# Patient Record
Sex: MALE | Race: WHITE | NOT HISPANIC OR LATINO | Employment: OTHER | URBAN - METROPOLITAN AREA
[De-identification: names, ages, dates, MRNs, and addresses within clinical notes are randomized per-mention and may not be internally consistent; named-entity substitution may affect disease eponyms.]

---

## 2017-01-01 ENCOUNTER — HOSPITAL ENCOUNTER (OUTPATIENT)
Dept: NON INVASIVE DIAGNOSTICS | Facility: CLINIC | Age: 82
Discharge: HOME | End: 2017-04-12
Payer: MEDICARE

## 2017-01-01 ENCOUNTER — GENERIC CONVERSION - ENCOUNTER (OUTPATIENT)
Dept: OTHER | Facility: OTHER | Age: 82
End: 2017-01-01

## 2017-01-01 ENCOUNTER — ALLSCRIPTS OFFICE VISIT (OUTPATIENT)
Dept: OTHER | Facility: OTHER | Age: 82
End: 2017-01-01

## 2017-01-01 ENCOUNTER — APPOINTMENT (EMERGENCY)
Dept: CT IMAGING | Facility: HOSPITAL | Age: 82
End: 2017-01-01
Payer: MEDICARE

## 2017-01-01 ENCOUNTER — HOSPITAL ENCOUNTER (INPATIENT)
Facility: HOSPITAL | Age: 82
LOS: 2 days | DRG: 064 | End: 2017-09-12
Attending: ANESTHESIOLOGY | Admitting: ANESTHESIOLOGY
Payer: MEDICARE

## 2017-01-01 ENCOUNTER — APPOINTMENT (EMERGENCY)
Dept: RADIOLOGY | Facility: HOSPITAL | Age: 82
End: 2017-01-01
Payer: MEDICARE

## 2017-01-01 ENCOUNTER — APPOINTMENT (OUTPATIENT)
Dept: LAB | Facility: CLINIC | Age: 82
End: 2017-01-01
Payer: MEDICARE

## 2017-01-01 ENCOUNTER — TRANSCRIBE ORDERS (OUTPATIENT)
Dept: NON INVASIVE DIAGNOSTICS | Facility: CLINIC | Age: 82
End: 2017-01-01

## 2017-01-01 ENCOUNTER — HOSPITAL ENCOUNTER (EMERGENCY)
Facility: HOSPITAL | Age: 82
End: 2017-09-10
Attending: EMERGENCY MEDICINE | Admitting: EMERGENCY MEDICINE
Payer: MEDICARE

## 2017-01-01 VITALS
WEIGHT: 175.49 LBS | RESPIRATION RATE: 15 BRPM | BODY MASS INDEX: 24.57 KG/M2 | HEIGHT: 71 IN | HEART RATE: 60 BPM | OXYGEN SATURATION: 99 % | SYSTOLIC BLOOD PRESSURE: 151 MMHG | TEMPERATURE: 100 F | DIASTOLIC BLOOD PRESSURE: 56 MMHG

## 2017-01-01 VITALS
DIASTOLIC BLOOD PRESSURE: 64 MMHG | RESPIRATION RATE: 20 BRPM | TEMPERATURE: 97.3 F | WEIGHT: 188.27 LBS | SYSTOLIC BLOOD PRESSURE: 141 MMHG | OXYGEN SATURATION: 100 % | HEART RATE: 95 BPM

## 2017-01-01 DIAGNOSIS — E78.5 HYPERLIPIDEMIA: ICD-10-CM

## 2017-01-01 DIAGNOSIS — N18.9 CHRONIC KIDNEY DISEASE: ICD-10-CM

## 2017-01-01 DIAGNOSIS — I25.10 ATHEROSCLEROTIC HEART DISEASE OF NATIVE CORONARY ARTERY WITHOUT ANGINA PECTORIS: ICD-10-CM

## 2017-01-01 DIAGNOSIS — I24.8 DEMAND ISCHEMIA (HCC): ICD-10-CM

## 2017-01-01 DIAGNOSIS — I10 ESSENTIAL (PRIMARY) HYPERTENSION: ICD-10-CM

## 2017-01-01 DIAGNOSIS — R73.9 HYPERGLYCEMIA: ICD-10-CM

## 2017-01-01 DIAGNOSIS — I61.5 INTRAVENTRICULAR HEMORRHAGE (HCC): ICD-10-CM

## 2017-01-01 DIAGNOSIS — I65.29 EXTERNAL CAROTID ARTERY STENOSIS: ICD-10-CM

## 2017-01-01 DIAGNOSIS — M62.82 RHABDOMYOLYSIS: ICD-10-CM

## 2017-01-01 DIAGNOSIS — K92.0 COFFEE GROUND EMESIS: ICD-10-CM

## 2017-01-01 DIAGNOSIS — I65.29 STENOSIS OF CAROTID ARTERY, UNSPECIFIED LATERALITY: Primary | ICD-10-CM

## 2017-01-01 DIAGNOSIS — I62.9 INTRACRANIAL HEMORRHAGE (HCC): Primary | ICD-10-CM

## 2017-01-01 DIAGNOSIS — K92.2 GI BLEED: ICD-10-CM

## 2017-01-01 DIAGNOSIS — N17.9 ACUTE RENAL FAILURE (HCC): ICD-10-CM

## 2017-01-01 DIAGNOSIS — E87.2 LACTIC ACID INCREASED: ICD-10-CM

## 2017-01-01 DIAGNOSIS — I60.9 SAH (SUBARACHNOID HEMORRHAGE) (HCC): Primary | ICD-10-CM

## 2017-01-01 LAB
ABO GROUP BLD: NORMAL
ALBUMIN SERPL BCP-MCNC: 2.9 G/DL (ref 3.5–5)
ALBUMIN SERPL BCP-MCNC: 3.8 G/DL (ref 3.5–5)
ALP SERPL-CCNC: 46 U/L (ref 46–116)
ALP SERPL-CCNC: 59 U/L (ref 46–116)
ALT SERPL W P-5'-P-CCNC: 29 U/L (ref 12–78)
ALT SERPL W P-5'-P-CCNC: 37 U/L (ref 12–78)
AMMONIA PLAS-SCNC: <10 UMOL/L (ref 11–35)
ANION GAP BLD CALC-SCNC: 21 MMOL/L (ref 4–13)
ANION GAP SERPL CALCULATED.3IONS-SCNC: 17 MMOL/L (ref 4–13)
ANION GAP SERPL CALCULATED.3IONS-SCNC: 6 MMOL/L (ref 4–13)
ANION GAP SERPL CALCULATED.3IONS-SCNC: 7 MMOL/L (ref 4–13)
APAP SERPL-MCNC: <2 UG/ML (ref 10–30)
APTT PPP: 30 SECONDS (ref 23–35)
APTT PPP: 30 SECONDS (ref 23–35)
ARTERIAL PATENCY WRIST A: YES
AST SERPL W P-5'-P-CCNC: 69 U/L (ref 5–45)
AST SERPL W P-5'-P-CCNC: 78 U/L (ref 5–45)
ATRIAL RATE: 103 BPM
BACTERIA UR QL AUTO: ABNORMAL /HPF
BASE EXCESS BLDA CALC-SCNC: -4.7 MMOL/L
BASOPHILS # BLD AUTO: 0.03 THOUSANDS/ΜL (ref 0–0.1)
BASOPHILS # BLD AUTO: 0.06 THOUSANDS/ΜL (ref 0–0.1)
BASOPHILS NFR BLD AUTO: 0 % (ref 0–1)
BASOPHILS NFR BLD AUTO: 1 % (ref 0–1)
BILIRUB SERPL-MCNC: 0.9 MG/DL (ref 0.2–1)
BILIRUB SERPL-MCNC: 1.47 MG/DL (ref 0.2–1)
BILIRUB UR QL STRIP: NEGATIVE
BLD GP AB SCN SERPL QL: NEGATIVE
BUN BLD-MCNC: 39 MG/DL (ref 5–25)
BUN SERPL-MCNC: 34 MG/DL (ref 5–25)
BUN SERPL-MCNC: 40 MG/DL (ref 5–25)
BUN SERPL-MCNC: 41 MG/DL (ref 5–25)
CA-I BLD-SCNC: 1.16 MMOL/L (ref 1.12–1.32)
CALCIUM SERPL-MCNC: 10 MG/DL (ref 8.3–10.1)
CALCIUM SERPL-MCNC: 8.3 MG/DL (ref 8.3–10.1)
CALCIUM SERPL-MCNC: 9.4 MG/DL (ref 8.3–10.1)
CHLORIDE BLD-SCNC: 105 MMOL/L (ref 100–108)
CHLORIDE SERPL-SCNC: 101 MMOL/L (ref 100–108)
CHLORIDE SERPL-SCNC: 107 MMOL/L (ref 100–108)
CHLORIDE SERPL-SCNC: 108 MMOL/L (ref 100–108)
CHOLEST SERPL-MCNC: 150 MG/DL (ref 50–200)
CK MB SERPL-MCNC: 1.3 % (ref 0–2.5)
CK MB SERPL-MCNC: 31.4 NG/ML (ref 0–5)
CK SERPL-CCNC: 2371 U/L (ref 39–308)
CLARITY UR: ABNORMAL
CO2 SERPL-SCNC: 22 MMOL/L (ref 21–32)
CO2 SERPL-SCNC: 25 MMOL/L (ref 21–32)
CO2 SERPL-SCNC: 27 MMOL/L (ref 21–32)
COLOR UR: YELLOW
CREAT BLD-MCNC: 1.8 MG/DL (ref 0.6–1.3)
CREAT SERPL-MCNC: 1.6 MG/DL (ref 0.6–1.3)
CREAT SERPL-MCNC: 1.67 MG/DL (ref 0.6–1.3)
CREAT SERPL-MCNC: 2.18 MG/DL (ref 0.6–1.3)
EOSINOPHIL # BLD AUTO: 0 THOUSAND/ΜL (ref 0–0.61)
EOSINOPHIL # BLD AUTO: 1.04 THOUSAND/ΜL (ref 0–0.61)
EOSINOPHIL NFR BLD AUTO: 0 % (ref 0–6)
EOSINOPHIL NFR BLD AUTO: 16 % (ref 0–6)
ERYTHROCYTE [DISTWIDTH] IN BLOOD BY AUTOMATED COUNT: 12 % (ref 11.6–15.1)
ERYTHROCYTE [DISTWIDTH] IN BLOOD BY AUTOMATED COUNT: 12.8 % (ref 11.6–15.1)
ERYTHROCYTE [DISTWIDTH] IN BLOOD BY AUTOMATED COUNT: 12.9 % (ref 11.6–15.1)
EST. AVERAGE GLUCOSE BLD GHB EST-MCNC: 137 MG/DL
ETHANOL SERPL-MCNC: <3 MG/DL (ref 0–3)
GFR SERPL CREATININE-BSD FRML MDRD: 26 ML/MIN/1.73SQ M
GFR SERPL CREATININE-BSD FRML MDRD: 33 ML/MIN/1.73SQ M
GFR SERPL CREATININE-BSD FRML MDRD: 36 ML/MIN/1.73SQ M
GFR SERPL CREATININE-BSD FRML MDRD: 41.2 ML/MIN/1.73SQ M
GLUCOSE P FAST SERPL-MCNC: 89 MG/DL (ref 65–99)
GLUCOSE SERPL-MCNC: 118 MG/DL (ref 65–140)
GLUCOSE SERPL-MCNC: 151 MG/DL (ref 65–140)
GLUCOSE SERPL-MCNC: 153 MG/DL (ref 65–140)
GLUCOSE UR STRIP-MCNC: NEGATIVE MG/DL
HBA1C MFR BLD: 6.4 % (ref 4.2–6.3)
HCO3 BLDA-SCNC: 20.2 MMOL/L (ref 22–28)
HCT VFR BLD AUTO: 37.1 % (ref 36.5–49.3)
HCT VFR BLD AUTO: 41.5 % (ref 36.5–49.3)
HCT VFR BLD AUTO: 43 % (ref 36.5–49.3)
HCT VFR BLD AUTO: 44.7 % (ref 36.5–49.3)
HCT VFR BLD CALC: 46 % (ref 36.5–49.3)
HDLC SERPL-MCNC: 36 MG/DL (ref 40–60)
HGB BLD-MCNC: 12.2 G/DL (ref 12–17)
HGB BLD-MCNC: 13.6 G/DL (ref 12–17)
HGB BLD-MCNC: 13.7 G/DL (ref 12–17)
HGB BLD-MCNC: 14.4 G/DL (ref 12–17)
HGB BLDA-MCNC: 15.6 G/DL (ref 12–17)
HGB UR QL STRIP.AUTO: ABNORMAL
HOROWITZ INDEX BLDA+IHG-RTO: 75 MM[HG]
HYALINE CASTS #/AREA URNS LPF: ABNORMAL /LPF
INR PPP: 1.14 (ref 0.86–1.16)
INR PPP: 1.19 (ref 0.86–1.16)
KETONES UR STRIP-MCNC: ABNORMAL MG/DL
LACTATE SERPL-SCNC: 9.3 MMOL/L (ref 0.5–2)
LDLC SERPL CALC-MCNC: 85 MG/DL (ref 0–100)
LEUKOCYTE ESTERASE UR QL STRIP: NEGATIVE
LYMPHOCYTES # BLD AUTO: 0.77 THOUSANDS/ΜL (ref 0.6–4.47)
LYMPHOCYTES # BLD AUTO: 1.34 THOUSANDS/ΜL (ref 0.6–4.47)
LYMPHOCYTES NFR BLD AUTO: 21 % (ref 14–44)
LYMPHOCYTES NFR BLD AUTO: 4 % (ref 14–44)
MAGNESIUM SERPL-MCNC: 1.8 MG/DL (ref 1.6–2.6)
MCH RBC QN AUTO: 31 PG (ref 26.8–34.3)
MCH RBC QN AUTO: 31.1 PG (ref 26.8–34.3)
MCH RBC QN AUTO: 31.6 PG (ref 26.8–34.3)
MCHC RBC AUTO-ENTMCNC: 32.2 G/DL (ref 31.4–37.4)
MCHC RBC AUTO-ENTMCNC: 32.8 G/DL (ref 31.4–37.4)
MCHC RBC AUTO-ENTMCNC: 32.9 G/DL (ref 31.4–37.4)
MCV RBC AUTO: 95 FL (ref 82–98)
MCV RBC AUTO: 96 FL (ref 82–98)
MCV RBC AUTO: 97 FL (ref 82–98)
MONOCYTES # BLD AUTO: 1.13 THOUSAND/ΜL (ref 0.17–1.22)
MONOCYTES # BLD AUTO: 2.19 THOUSAND/ΜL (ref 0.17–1.22)
MONOCYTES NFR BLD AUTO: 11 % (ref 4–12)
MONOCYTES NFR BLD AUTO: 17 % (ref 4–12)
NEUTROPHILS # BLD AUTO: 17.56 THOUSANDS/ΜL (ref 1.85–7.62)
NEUTROPHILS # BLD AUTO: 2.96 THOUSANDS/ΜL (ref 1.85–7.62)
NEUTS SEG NFR BLD AUTO: 45 % (ref 43–75)
NEUTS SEG NFR BLD AUTO: 85 % (ref 43–75)
NITRITE UR QL STRIP: NEGATIVE
NON-SQ EPI CELLS URNS QL MICRO: ABNORMAL /HPF
NRBC BLD AUTO-RTO: 0 /100 WBCS
NRBC BLD AUTO-RTO: 0 /100 WBCS
O2 CT BLDA-SCNC: 19.8 ML/DL (ref 16–23)
OXYHGB MFR BLDA: 98.4 % (ref 94–97)
P AXIS: 59 DEGREES
PCO2 BLD: 22 MMOL/L (ref 21–32)
PCO2 BLDA: 37.1 MM HG (ref 36–44)
PEEP RESPIRATORY: 5 CM[H2O]
PH BLDA: 7.35 [PH] (ref 7.35–7.45)
PH UR STRIP.AUTO: 6 [PH] (ref 4.5–8)
PLATELET # BLD AUTO: 170 THOUSANDS/UL (ref 149–390)
PLATELET # BLD AUTO: 213 THOUSANDS/UL (ref 149–390)
PLATELET # BLD AUTO: 215 THOUSANDS/UL (ref 149–390)
PMV BLD AUTO: 10.7 FL (ref 8.9–12.7)
PMV BLD AUTO: 11.6 FL (ref 8.9–12.7)
PMV BLD AUTO: 11.6 FL (ref 8.9–12.7)
PO2 BLDA: 219.3 MM HG (ref 75–129)
POTASSIUM BLD-SCNC: 4.9 MMOL/L (ref 3.5–5.3)
POTASSIUM SERPL-SCNC: 4.9 MMOL/L (ref 3.5–5.3)
POTASSIUM SERPL-SCNC: 4.9 MMOL/L (ref 3.5–5.3)
POTASSIUM SERPL-SCNC: 6.1 MMOL/L (ref 3.5–5.3)
PR INTERVAL: 242 MS
PROT SERPL-MCNC: 6.6 G/DL (ref 6.4–8.2)
PROT SERPL-MCNC: 8.4 G/DL (ref 6.4–8.2)
PROT UR STRIP-MCNC: ABNORMAL MG/DL
PROTHROMBIN TIME: 14.9 SECONDS (ref 12.1–14.4)
PROTHROMBIN TIME: 15.2 SECONDS (ref 12.1–14.4)
QRS AXIS: 12 DEGREES
QRSD INTERVAL: 76 MS
QT INTERVAL: 324 MS
QTC INTERVAL: 424 MS
RBC # BLD AUTO: 3.92 MILLION/UL (ref 3.88–5.62)
RBC # BLD AUTO: 4.3 MILLION/UL (ref 3.88–5.62)
RBC # BLD AUTO: 4.64 MILLION/UL (ref 3.88–5.62)
RBC #/AREA URNS AUTO: ABNORMAL /HPF
RH BLD: NEGATIVE
SALICYLATES SERPL-MCNC: <3 MG/DL (ref 3–20)
SODIUM BLD-SCNC: 142 MMOL/L (ref 136–145)
SODIUM SERPL-SCNC: 140 MMOL/L (ref 136–145)
SP GR UR STRIP.AUTO: 1.02 (ref 1–1.03)
SPECIMEN EXPIRATION DATE: NORMAL
SPECIMEN SOURCE: ABNORMAL
T WAVE AXIS: 29 DEGREES
TRIGL SERPL-MCNC: 145 MG/DL
TROPONIN I BLD-MCNC: 0.42 NG/ML (ref 0–0.08)
TROPONIN I SERPL-MCNC: 0.99 NG/ML
UROBILINOGEN UR QL STRIP.AUTO: 0.2 E.U./DL
VENT AC: 12
VENT- AC: AC
VENTRICULAR RATE: 103 BPM
VT SETTING VENT: 450 ML
WBC # BLD AUTO: 20.69 THOUSAND/UL (ref 4.31–10.16)
WBC # BLD AUTO: 20.7 THOUSAND/UL (ref 4.31–10.16)
WBC # BLD AUTO: 6.55 THOUSAND/UL (ref 4.31–10.16)
WBC #/AREA URNS AUTO: ABNORMAL /HPF

## 2017-01-01 PROCEDURE — 80053 COMPREHEN METABOLIC PANEL: CPT | Performed by: EMERGENCY MEDICINE

## 2017-01-01 PROCEDURE — C9113 INJ PANTOPRAZOLE SODIUM, VIA: HCPCS | Performed by: EMERGENCY MEDICINE

## 2017-01-01 PROCEDURE — 94002 VENT MGMT INPAT INIT DAY: CPT

## 2017-01-01 PROCEDURE — 84484 ASSAY OF TROPONIN QUANT: CPT

## 2017-01-01 PROCEDURE — 94760 N-INVAS EAR/PLS OXIMETRY 1: CPT

## 2017-01-01 PROCEDURE — 83735 ASSAY OF MAGNESIUM: CPT | Performed by: PHYSICIAN ASSISTANT

## 2017-01-01 PROCEDURE — 86901 BLOOD TYPING SEROLOGIC RH(D): CPT | Performed by: EMERGENCY MEDICINE

## 2017-01-01 PROCEDURE — 83605 ASSAY OF LACTIC ACID: CPT | Performed by: EMERGENCY MEDICINE

## 2017-01-01 PROCEDURE — 71010 HB CHEST X-RAY 1 VIEW FRONTAL (PORTABLE): CPT

## 2017-01-01 PROCEDURE — 72125 CT NECK SPINE W/O DYE: CPT

## 2017-01-01 PROCEDURE — 96366 THER/PROPH/DIAG IV INF ADDON: CPT

## 2017-01-01 PROCEDURE — 84484 ASSAY OF TROPONIN QUANT: CPT | Performed by: EMERGENCY MEDICINE

## 2017-01-01 PROCEDURE — 93880 EXTRACRANIAL BILAT STUDY: CPT

## 2017-01-01 PROCEDURE — 96360 HYDRATION IV INFUSION INIT: CPT

## 2017-01-01 PROCEDURE — 71250 CT THORAX DX C-: CPT

## 2017-01-01 PROCEDURE — 80320 DRUG SCREEN QUANTALCOHOLS: CPT | Performed by: EMERGENCY MEDICINE

## 2017-01-01 PROCEDURE — 81001 URINALYSIS AUTO W/SCOPE: CPT | Performed by: EMERGENCY MEDICINE

## 2017-01-01 PROCEDURE — 80047 BASIC METABLC PNL IONIZED CA: CPT

## 2017-01-01 PROCEDURE — 85730 THROMBOPLASTIN TIME PARTIAL: CPT | Performed by: EMERGENCY MEDICINE

## 2017-01-01 PROCEDURE — 85027 COMPLETE CBC AUTOMATED: CPT | Performed by: PHYSICIAN ASSISTANT

## 2017-01-01 PROCEDURE — 99291 CRITICAL CARE FIRST HOUR: CPT

## 2017-01-01 PROCEDURE — 82553 CREATINE MB FRACTION: CPT | Performed by: EMERGENCY MEDICINE

## 2017-01-01 PROCEDURE — 80329 ANALGESICS NON-OPIOID 1 OR 2: CPT | Performed by: EMERGENCY MEDICINE

## 2017-01-01 PROCEDURE — 83036 HEMOGLOBIN GLYCOSYLATED A1C: CPT

## 2017-01-01 PROCEDURE — 80061 LIPID PANEL: CPT

## 2017-01-01 PROCEDURE — 85610 PROTHROMBIN TIME: CPT | Performed by: PHYSICIAN ASSISTANT

## 2017-01-01 PROCEDURE — 93005 ELECTROCARDIOGRAM TRACING: CPT | Performed by: EMERGENCY MEDICINE

## 2017-01-01 PROCEDURE — 94003 VENT MGMT INPAT SUBQ DAY: CPT

## 2017-01-01 PROCEDURE — 86900 BLOOD TYPING SEROLOGIC ABO: CPT | Performed by: EMERGENCY MEDICINE

## 2017-01-01 PROCEDURE — 82140 ASSAY OF AMMONIA: CPT | Performed by: EMERGENCY MEDICINE

## 2017-01-01 PROCEDURE — 82805 BLOOD GASES W/O2 SATURATION: CPT | Performed by: EMERGENCY MEDICINE

## 2017-01-01 PROCEDURE — 82550 ASSAY OF CK (CPK): CPT | Performed by: EMERGENCY MEDICINE

## 2017-01-01 PROCEDURE — 96365 THER/PROPH/DIAG IV INF INIT: CPT

## 2017-01-01 PROCEDURE — 85018 HEMOGLOBIN: CPT | Performed by: EMERGENCY MEDICINE

## 2017-01-01 PROCEDURE — 36415 COLL VENOUS BLD VENIPUNCTURE: CPT

## 2017-01-01 PROCEDURE — 70450 CT HEAD/BRAIN W/O DYE: CPT

## 2017-01-01 PROCEDURE — 85014 HEMATOCRIT: CPT

## 2017-01-01 PROCEDURE — 74176 CT ABD & PELVIS W/O CONTRAST: CPT

## 2017-01-01 PROCEDURE — 70486 CT MAXILLOFACIAL W/O DYE: CPT

## 2017-01-01 PROCEDURE — C9113 INJ PANTOPRAZOLE SODIUM, VIA: HCPCS | Performed by: PHYSICIAN ASSISTANT

## 2017-01-01 PROCEDURE — 85025 COMPLETE CBC W/AUTO DIFF WBC: CPT | Performed by: EMERGENCY MEDICINE

## 2017-01-01 PROCEDURE — 86850 RBC ANTIBODY SCREEN: CPT | Performed by: EMERGENCY MEDICINE

## 2017-01-01 PROCEDURE — 85014 HEMATOCRIT: CPT | Performed by: EMERGENCY MEDICINE

## 2017-01-01 PROCEDURE — 85730 THROMBOPLASTIN TIME PARTIAL: CPT | Performed by: PHYSICIAN ASSISTANT

## 2017-01-01 PROCEDURE — 80048 BASIC METABOLIC PNL TOTAL CA: CPT

## 2017-01-01 PROCEDURE — 85610 PROTHROMBIN TIME: CPT | Performed by: EMERGENCY MEDICINE

## 2017-01-01 PROCEDURE — 36600 WITHDRAWAL OF ARTERIAL BLOOD: CPT

## 2017-01-01 PROCEDURE — 85025 COMPLETE CBC W/AUTO DIFF WBC: CPT

## 2017-01-01 PROCEDURE — 80053 COMPREHEN METABOLIC PANEL: CPT | Performed by: PHYSICIAN ASSISTANT

## 2017-01-01 PROCEDURE — 0BP1XDZ REMOVAL OF INTRALUMINAL DEVICE FROM TRACHEA, EXTERNAL APPROACH: ICD-10-PCS | Performed by: EMERGENCY MEDICINE

## 2017-01-01 PROCEDURE — 36415 COLL VENOUS BLD VENIPUNCTURE: CPT | Performed by: EMERGENCY MEDICINE

## 2017-01-01 PROCEDURE — 5A1945Z RESPIRATORY VENTILATION, 24-96 CONSECUTIVE HOURS: ICD-10-PCS | Performed by: EMERGENCY MEDICINE

## 2017-01-01 RX ORDER — SODIUM CHLORIDE, SODIUM GLUCONATE, SODIUM ACETATE, POTASSIUM CHLORIDE, MAGNESIUM CHLORIDE, SODIUM PHOSPHATE, DIBASIC, AND POTASSIUM PHOSPHATE .53; .5; .37; .037; .03; .012; .00082 G/100ML; G/100ML; G/100ML; G/100ML; G/100ML; G/100ML; G/100ML
125 INJECTION, SOLUTION INTRAVENOUS CONTINUOUS
Status: DISCONTINUED | OUTPATIENT
Start: 2017-01-01 | End: 2017-01-01

## 2017-01-01 RX ORDER — LABETALOL HYDROCHLORIDE 5 MG/ML
10 INJECTION, SOLUTION INTRAVENOUS ONCE
Status: COMPLETED | OUTPATIENT
Start: 2017-01-01 | End: 2017-01-01

## 2017-01-01 RX ORDER — MIDAZOLAM HYDROCHLORIDE 1 MG/ML
5 INJECTION INTRAMUSCULAR; INTRAVENOUS ONCE
Status: COMPLETED | OUTPATIENT
Start: 2017-01-01 | End: 2017-01-01

## 2017-01-01 RX ORDER — PROPOFOL 10 MG/ML
5-50 INJECTION, EMULSION INTRAVENOUS
Status: DISCONTINUED | OUTPATIENT
Start: 2017-01-01 | End: 2017-01-01

## 2017-01-01 RX ORDER — LABETALOL HYDROCHLORIDE 5 MG/ML
10 INJECTION, SOLUTION INTRAVENOUS EVERY 6 HOURS PRN
Status: DISCONTINUED | OUTPATIENT
Start: 2017-01-01 | End: 2017-01-01

## 2017-01-01 RX ORDER — PRAVASTATIN SODIUM 40 MG
40 TABLET ORAL DAILY
COMMUNITY

## 2017-01-01 RX ORDER — LORAZEPAM 2 MG/ML
1 INJECTION INTRAMUSCULAR
Status: DISCONTINUED | OUTPATIENT
Start: 2017-01-01 | End: 2017-01-01 | Stop reason: HOSPADM

## 2017-01-01 RX ORDER — SODIUM CHLORIDE 9 MG/ML
125 INJECTION, SOLUTION INTRAVENOUS CONTINUOUS
Status: DISCONTINUED | OUTPATIENT
Start: 2017-01-01 | End: 2017-01-01

## 2017-01-01 RX ORDER — ETOMIDATE 2 MG/ML
20 INJECTION INTRAVENOUS ONCE
Status: COMPLETED | OUTPATIENT
Start: 2017-01-01 | End: 2017-01-01

## 2017-01-01 RX ORDER — ACETAMINOPHEN 160 MG/5ML
650 SUSPENSION, ORAL (FINAL DOSE FORM) ORAL EVERY 6 HOURS PRN
Status: DISCONTINUED | OUTPATIENT
Start: 2017-01-01 | End: 2017-01-01

## 2017-01-01 RX ORDER — ACETAMINOPHEN 160 MG/5ML
650 SUSPENSION, ORAL (FINAL DOSE FORM) ORAL ONCE
Status: COMPLETED | OUTPATIENT
Start: 2017-01-01 | End: 2017-01-01

## 2017-01-01 RX ORDER — CLOPIDOGREL BISULFATE 75 MG/1
75 TABLET ORAL DAILY
COMMUNITY

## 2017-01-01 RX ORDER — MORPHINE SULFATE 10 MG/ML
10 INJECTION, SOLUTION INTRAMUSCULAR; INTRAVENOUS
Status: DISCONTINUED | OUTPATIENT
Start: 2017-01-01 | End: 2017-01-01 | Stop reason: HOSPADM

## 2017-01-01 RX ORDER — PROPOFOL 10 MG/ML
5-50 INJECTION, EMULSION INTRAVENOUS
Status: DISCONTINUED | OUTPATIENT
Start: 2017-01-01 | End: 2017-01-01 | Stop reason: HOSPADM

## 2017-01-01 RX ORDER — NITROGLYCERIN 0.4 MG/1
0.4 TABLET SUBLINGUAL
COMMUNITY

## 2017-01-01 RX ORDER — FENTANYL CITRATE 50 UG/ML
50 INJECTION, SOLUTION INTRAMUSCULAR; INTRAVENOUS ONCE
Status: COMPLETED | OUTPATIENT
Start: 2017-01-01 | End: 2017-01-01

## 2017-01-01 RX ORDER — METOPROLOL SUCCINATE 25 MG/1
25 TABLET, EXTENDED RELEASE ORAL DAILY
COMMUNITY

## 2017-01-01 RX ORDER — FUROSEMIDE 10 MG/ML
40 INJECTION INTRAMUSCULAR; INTRAVENOUS ONCE
Status: COMPLETED | OUTPATIENT
Start: 2017-01-01 | End: 2017-01-01

## 2017-01-01 RX ORDER — VECURONIUM BROMIDE 1 MG/ML
10 INJECTION, POWDER, LYOPHILIZED, FOR SOLUTION INTRAVENOUS ONCE
Status: COMPLETED | OUTPATIENT
Start: 2017-01-01 | End: 2017-01-01

## 2017-01-01 RX ORDER — LOSARTAN POTASSIUM 50 MG/1
25 TABLET ORAL DAILY
COMMUNITY

## 2017-01-01 RX ORDER — ACETAMINOPHEN 160 MG/5ML
SUSPENSION, ORAL (FINAL DOSE FORM) ORAL
Status: COMPLETED
Start: 2017-01-01 | End: 2017-01-01

## 2017-01-01 RX ORDER — GLYCOPYRROLATE 0.2 MG/ML
0.2 INJECTION INTRAMUSCULAR; INTRAVENOUS
Status: DISCONTINUED | OUTPATIENT
Start: 2017-01-01 | End: 2017-01-01 | Stop reason: HOSPADM

## 2017-01-01 RX ADMIN — SODIUM CHLORIDE 125 ML/HR: 0.9 INJECTION, SOLUTION INTRAVENOUS at 23:37

## 2017-01-01 RX ADMIN — LORAZEPAM 1 MG: 2 INJECTION INTRAMUSCULAR; INTRAVENOUS at 18:53

## 2017-01-01 RX ADMIN — LORAZEPAM 1 MG: 2 INJECTION INTRAMUSCULAR; INTRAVENOUS at 16:52

## 2017-01-01 RX ADMIN — SODIUM CHLORIDE 8 MG/HR: 9 INJECTION, SOLUTION INTRAVENOUS at 10:37

## 2017-01-01 RX ADMIN — SODIUM CHLORIDE 8 MG/HR: 9 INJECTION, SOLUTION INTRAVENOUS at 23:41

## 2017-01-01 RX ADMIN — SODIUM CHLORIDE 2000 ML: 0.9 INJECTION, SOLUTION INTRAVENOUS at 15:00

## 2017-01-01 RX ADMIN — PROPOFOL 25 MCG/KG/MIN: 10 INJECTION, EMULSION INTRAVENOUS at 21:37

## 2017-01-01 RX ADMIN — SODIUM CHLORIDE 125 ML/HR: 0.9 INJECTION, SOLUTION INTRAVENOUS at 06:25

## 2017-01-01 RX ADMIN — MORPHINE SULFATE 10 MG: 10 INJECTION, SOLUTION INTRAMUSCULAR; INTRAVENOUS at 18:49

## 2017-01-01 RX ADMIN — ETOMIDATE 20 MG: 2 INJECTION, SOLUTION INTRAVENOUS at 14:55

## 2017-01-01 RX ADMIN — GLYCOPYRROLATE 0.2 MG: 0.2 INJECTION, SOLUTION INTRAMUSCULAR; INTRAVENOUS at 16:52

## 2017-01-01 RX ADMIN — ACETAMINOPHEN 650 MG: 160 SUSPENSION ORAL at 14:00

## 2017-01-01 RX ADMIN — LABETALOL 20 MG/4 ML (5 MG/ML) INTRAVENOUS SYRINGE 10 MG: at 13:16

## 2017-01-01 RX ADMIN — PROPOFOL 10 MCG/KG/MIN: 10 INJECTION, EMULSION INTRAVENOUS at 15:18

## 2017-01-01 RX ADMIN — PROPOFOL 20 MCG/KG/MIN: 10 INJECTION, EMULSION INTRAVENOUS at 04:56

## 2017-01-01 RX ADMIN — MORPHINE SULFATE 10 MG: 10 INJECTION, SOLUTION INTRAMUSCULAR; INTRAVENOUS at 17:15

## 2017-01-01 RX ADMIN — Medication 650 MG: at 13:24

## 2017-01-01 RX ADMIN — LABETALOL 20 MG/4 ML (5 MG/ML) INTRAVENOUS SYRINGE 10 MG: at 19:32

## 2017-01-01 RX ADMIN — SODIUM CHLORIDE 8 MG/HR: 9 INJECTION, SOLUTION INTRAVENOUS at 15:34

## 2017-01-01 RX ADMIN — DESMOPRESSIN ACETATE 25.6 MCG: 4 SOLUTION INTRAVENOUS at 16:38

## 2017-01-01 RX ADMIN — Medication 5 MG/HR: at 16:53

## 2017-01-01 RX ADMIN — LABETALOL 20 MG/4 ML (5 MG/ML) INTRAVENOUS SYRINGE 10 MG: at 12:01

## 2017-01-01 RX ADMIN — SODIUM CHLORIDE 1000 ML: 0.9 INJECTION, SOLUTION INTRAVENOUS at 15:51

## 2017-01-01 RX ADMIN — SODIUM CHLORIDE 1000 ML: 0.9 INJECTION, SOLUTION INTRAVENOUS at 22:30

## 2017-01-01 RX ADMIN — SODIUM CHLORIDE 125 ML/HR: 0.9 INJECTION, SOLUTION INTRAVENOUS at 15:40

## 2017-01-01 RX ADMIN — PROPOFOL 24.92 MCG/KG/MIN: 10 INJECTION, EMULSION INTRAVENOUS at 12:34

## 2017-01-01 RX ADMIN — LABETALOL 20 MG/4 ML (5 MG/ML) INTRAVENOUS SYRINGE 10 MG: at 06:29

## 2017-01-01 RX ADMIN — ACETAMINOPHEN 650 MG: 160 SUSPENSION ORAL at 13:24

## 2017-01-01 RX ADMIN — PROPOFOL 25 MCG/KG/MIN: 10 INJECTION, EMULSION INTRAVENOUS at 04:12

## 2017-01-01 RX ADMIN — VECURONIUM BROMIDE 10 MG: 1 INJECTION, POWDER, LYOPHILIZED, FOR SOLUTION INTRAVENOUS at 14:56

## 2017-01-01 RX ADMIN — ACETAMINOPHEN 650 MG: 160 SUSPENSION ORAL at 01:03

## 2017-01-01 RX ADMIN — PROPOFOL 20 MCG/KG/MIN: 10 INJECTION, EMULSION INTRAVENOUS at 13:18

## 2017-01-01 RX ADMIN — LABETALOL 20 MG/4 ML (5 MG/ML) INTRAVENOUS SYRINGE 10 MG: at 21:41

## 2017-01-01 RX ADMIN — SODIUM CHLORIDE 125 ML/HR: 0.9 INJECTION, SOLUTION INTRAVENOUS at 07:37

## 2017-01-01 RX ADMIN — PROPOFOL 20 MCG/KG/MIN: 10 INJECTION, EMULSION INTRAVENOUS at 22:32

## 2017-01-01 RX ADMIN — FUROSEMIDE 40 MG: 10 INJECTION, SOLUTION INTRAMUSCULAR; INTRAVENOUS at 22:40

## 2017-01-01 RX ADMIN — SODIUM CHLORIDE, SODIUM GLUCONATE, SODIUM ACETATE, POTASSIUM CHLORIDE, MAGNESIUM CHLORIDE, SODIUM PHOSPHATE, DIBASIC, AND POTASSIUM PHOSPHATE 125 ML/HR: .53; .5; .37; .037; .03; .012; .00082 INJECTION, SOLUTION INTRAVENOUS at 19:00

## 2017-01-01 RX ADMIN — SODIUM CHLORIDE 80 MG: 9 INJECTION, SOLUTION INTRAVENOUS at 15:21

## 2017-01-01 RX ADMIN — SODIUM CHLORIDE 125 ML/HR: 0.9 INJECTION, SOLUTION INTRAVENOUS at 23:42

## 2017-09-11 PROBLEM — S05.10XA CONTUSION OF ORBITAL TISSUES: Status: ACTIVE | Noted: 2017-01-01

## 2017-09-11 PROBLEM — I60.9 SAH (SUBARACHNOID HEMORRHAGE) (HCC): Status: ACTIVE | Noted: 2017-01-01

## 2017-09-11 PROBLEM — G93.40 ENCEPHALOPATHY: Status: ACTIVE | Noted: 2017-01-01

## 2017-09-11 PROBLEM — J96.00 ACUTE RESPIRATORY FAILURE (HCC): Status: ACTIVE | Noted: 2017-01-01

## 2017-09-11 PROBLEM — I61.9 ICH (INTRACEREBRAL HEMORRHAGE) (HCC): Status: ACTIVE | Noted: 2017-01-01

## 2018-01-11 NOTE — RESULT NOTES
Message  CTA of the neck ordered for evaluation of left carotid stenosis  Received a phone call from Dr Miles Lopez; left carotid is over 90% occluded at the origin and near occlusive  Of concern is a cerebral aneurysm visualized at the edge of imaging which has now increased from 5 mm to near 7 mm  I have ordered a formal CTA of the head for further evaluation and a neurosurgery referral  Will move his appointment up to be seen next week in our office  Plan  Aneurysm, cerebral    · CTA HEAD W WO CONTRAST; Status:Hold For - Scheduling; Requested for:25Mar2016;    · 1 - Lucas Thakur DO 33 Neurosurgery Physician Referral  Consult  Status: Hold For -  Scheduling  Requested for: [id number]  Care Summary provided  : Yes    Discussion/Summary  Your results show some abnormalities        Signatures   Electronically signed by : Sariah Bhatti; Mar 25 2016  1:36PM EST                       (Author)

## 2018-01-12 VITALS
BODY MASS INDEX: 26.07 KG/M2 | SYSTOLIC BLOOD PRESSURE: 140 MMHG | HEIGHT: 69 IN | DIASTOLIC BLOOD PRESSURE: 72 MMHG | OXYGEN SATURATION: 93 % | WEIGHT: 176 LBS | HEART RATE: 63 BPM

## 2018-01-12 NOTE — MISCELLANEOUS
Message  per Dominique Tucker, she received a phone call from Dr Luiz Armenta in [facility] that ambreen needs a CTA of head and referral to Dr Brook Rodriguez  Scheduling will set up CTA, and I called Dr Génesis Gibbs office and asked them for an appointment  I spoke to Nohemi Faith in Dr Génesis Gibbs office and she will call carlot  i told patient to expect phone calls from [address] [phone]  Active Problems    1  Abnormal blood chemistry (790 6) (R79 9)   2  Actinic keratosis (702 0) (L57 0)   3  Aneurysm, cerebral (437 3) (I67 1)   4  Asymptomatic stenosis of left carotid artery (433 10) (I65 22)   5  Atherosclerosis of native coronary artery without angina pectoris (414 01) (I25 10)   6  Coronary arteriosclerosis (414 00) (I25 10)   7  Encounter for prostate cancer screening (V76 44) (Z12 5)   8  Hyperglycemia (790 29) (R73 9)   9  Hyperlipidemia (272 4) (E78 5)   10  Hypertension (401 9) (I10)   11  Malignant neoplasm of skin (173 90) (C44 90)   12  Old myocardial infarction (412) (I25 2)   13  Screening for skin condition (V82 0) (Z13 89)   14  Seborrheic keratosis (702 19) (L82 1)    Current Meds   1  Aspirin 81 MG Oral Tablet; Therapy: (Recorded:15Jan2014) to Recorded   2  Clopidogrel Bisulfate 75 MG Oral Tablet (Plavix); TAKE ONE TABLET BY MOUTH ONCE   DAILY; Therapy: 87LJP3889 to (Evaluate:11Oct2016)  Requested for: 42QQB4888; Last   Rx:15Jan2016 Ordered   3  Cozaar 50 MG Oral Tablet (Losartan Potassium); TAKE 1 TABLET DAILY; Therapy: (Recorded:13Jan2014) to Recorded   4  Losartan Potassium 50 MG Oral Tablet; TAKE ONE TABLET BY MOUTH ONCE DAILY; Therapy: 27DNW9528 to (Hilary Angeles)  Requested for: 44GST7316; Last   Rx:74Wfr6304 Ordered   5  Metoprolol Succinate ER 25 MG Oral Tablet Extended Release 24 Hour; take one tablet   by mouth every day; Therapy: 00Ldm1209 to (Evaluate:55Iqd3994)  Requested for: 21Apr2015; Last   Rx:21Apr2015 Ordered   6  Nitroglycerin 0 4 MG SUBL; SL AS DIRECTED; Therapy: (Recorded:13Jan2014) to Recorded   7  Pravastatin Sodium 40 MG Oral Tablet; TAKE ONE TABLET BY MOUTH ONCE DAILY; Therapy: 18XRF3963 to (Evaluate:22Jan2017)  Requested for: 59BSX2154; Last   Rx:28Jan2016 Ordered   8  Tylenol 8 Hour 650 MG Oral Tablet Extended Release; 1 tab q 4-6 hrs prn; Therapy: (Recorded:13Jan2014) to Recorded    Allergies    1  No Known Drug Allergies    2  Eggs   3  Milk    Signatures   Electronically signed by :  Patrick Salcedo, ; Mar 25 2016  1:59PM EST                       (Author)

## 2018-01-12 NOTE — MISCELLANEOUS
Message  Carmelina Gandhi called from neurosurgical that patient will not make an appointment for Dr Arvind Lock until he speaks to us, I had already emailed Dr Herman Diop about this and he said he will talk to patient on 4/12 at his appointment  tried to call Carmelina Gandhi back, but was on hold too long  asked Kevin Barone or yeimy to return call tomorrow  Active Problems    1  Abnormal blood chemistry (790 6) (R79 9)   2  Actinic keratosis (702 0) (L57 0)   3  Aneurysm, cerebral (437 3) (I67 1)   4  Asymptomatic stenosis of left carotid artery (433 10) (I65 22)   5  Atherosclerosis of native coronary artery without angina pectoris (414 01) (I25 10)   6  Coronary arteriosclerosis (414 00) (I25 10)   7  Encounter for prostate cancer screening (V76 44) (Z12 5)   8  Hyperglycemia (790 29) (R73 9)   9  Hyperlipidemia (272 4) (E78 5)   10  Hypertension (401 9) (I10)   11  Malignant neoplasm of skin (173 90) (C44 90)   12  Old myocardial infarction (412) (I25 2)   13  Screening for skin condition (V82 0) (Z13 89)   14  Seborrheic keratosis (702 19) (L82 1)    Current Meds   1  Aspirin 81 MG Oral Tablet; Therapy: (Recorded:15Jan2014) to Recorded   2  Clopidogrel Bisulfate 75 MG Oral Tablet (Plavix); TAKE ONE TABLET BY MOUTH ONCE   DAILY; Therapy: 90RUC9528 to (Evaluate:08Cqu5740)  Requested for: 41QUO6762; Last   Rx:15Jan2016 Ordered   3  Cozaar 50 MG Oral Tablet (Losartan Potassium); TAKE 1 TABLET DAILY; Therapy: (Recorded:13Jan2014) to Recorded   4  Losartan Potassium 50 MG Oral Tablet; TAKE ONE TABLET BY MOUTH ONCE DAILY; Therapy: 80ORR7155 to (Christiana Nair)  Requested for: 64FTY5206; Last   Rx:23Dvr9166 Ordered   5  Metoprolol Succinate ER 25 MG Oral Tablet Extended Release 24 Hour; take one tablet   by mouth every day; Therapy: 15Apr2014 to (Evaluate:27Nqe0128)  Requested for: 27Wqz9787; Last   Rx:21Apr2015 Ordered   6  Nitroglycerin 0 4 MG SUBL; SL AS DIRECTED; Therapy: (Recorded:13Jan2014) to Recorded   7   Pravastatin Sodium 40 MG Oral Tablet; TAKE ONE TABLET BY MOUTH ONCE DAILY; Therapy: 86KYM5853 to (Evaluate:22Jan2017)  Requested for: 63EHK5727; Last   Rx:28Jan2016 Ordered   8  Tylenol 8 Hour 650 MG Oral Tablet Extended Release; 1 tab q 4-6 hrs prn; Therapy: (Recorded:13Jan2014) to Recorded    Allergies    1  No Known Drug Allergies    2  Eggs   3  Milk    Signatures   Electronically signed by :  Flo Hodges, ; Mar 30 2016  5:30PM EST                       (Author)

## 2018-01-13 VITALS
OXYGEN SATURATION: 96 % | DIASTOLIC BLOOD PRESSURE: 72 MMHG | HEART RATE: 60 BPM | SYSTOLIC BLOOD PRESSURE: 140 MMHG | HEIGHT: 69 IN | WEIGHT: 182 LBS | BODY MASS INDEX: 26.96 KG/M2

## 2018-01-13 VITALS
SYSTOLIC BLOOD PRESSURE: 130 MMHG | HEART RATE: 58 BPM | HEIGHT: 69 IN | OXYGEN SATURATION: 96 % | DIASTOLIC BLOOD PRESSURE: 70 MMHG | BODY MASS INDEX: 25.92 KG/M2 | WEIGHT: 175 LBS

## 2018-01-13 NOTE — MISCELLANEOUS
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)   4  Aneurysm, cerebral (437 3) (I67 1)   5  Asymptomatic stenosis of left carotid artery (433 10) (I65 22)   6  Atherosclerosis of native coronary artery without angina pectoris (414 01) (I25 10)   7  Coronary arteriosclerosis (414 00) (I25 10)   8  Encounter for prostate cancer screening (V76 44) (Z12 5)   9  Hyperglycemia (790 29) (R73 9)   10  Hyperlipidemia (272 4) (E78 5)   11  Hypertension (401 9) (I10)   12  Malignant neoplasm of skin (173 90) (C44 90)   13  Old myocardial infarction (412) (I25 2)   14  Screening for skin condition (V82 0) (Z13 89)   15  Seborrheic keratosis (702 19) (L82 1)    Current Meds   1  Aspirin 81 MG Oral Tablet; Therapy: (Recorded:15Jan2014) to Recorded   2  Clopidogrel Bisulfate 75 MG Oral Tablet (Plavix); TAKE ONE TABLET BY MOUTH ONCE   DAILY; Therapy: 15CRN5503 to (Evaluate:11Oct2016)  Requested for: 24FHJ6003; Last   Rx:15Jan2016 Ordered   3  Cozaar 50 MG Oral Tablet (Losartan Potassium); TAKE 1 TABLET DAILY; Therapy: (Recorded:13Jan2014) to Recorded   4  Losartan Potassium 50 MG Oral Tablet; TAKE ONE TABLET BY MOUTH ONCE DAILY; Therapy: 60CPM9024 to (Eddie Killer)  Requested for: 41GXK3376; Last   Rx:36Dnq2259 Ordered   5  Metoprolol Succinate ER 25 MG Oral Tablet Extended Release 24 Hour; take one tablet   by mouth every day; Therapy: 15Apr2014 to (Evaluate:08Apr2017)  Requested for: 79Bss7620; Last   Rx:13Apr2016 Ordered   6  Nitroglycerin 0 4 MG SUBL; SL AS DIRECTED;    Therapy: (UIPKLKEU:77DDR5863) to Recorded   7  Pravastatin Sodium 40 MG Oral Tablet; TAKE ONE TABLET BY MOUTH ONCE DAILY; Therapy: 20IOA3880 to (Evaluate:22Jan2017)  Requested for: 12DDF8017; Last   Rx:28Jan2016 Ordered   8  Tylenol 8 Hour 650 MG Oral Tablet Extended Release; 1 tab q 4-6 hrs prn; Therapy: (Recorded:13Jan2014) to Recorded    Allergies    1  No Known Drug Allergies    2  Eggs   3  Milk    Signatures   Electronically signed by :  General Lewis, ; Apr 15 2016 11:16AM EST                       (Author)

## 2018-01-14 VITALS
BODY MASS INDEX: 25.93 KG/M2 | HEIGHT: 69 IN | HEART RATE: 58 BPM | DIASTOLIC BLOOD PRESSURE: 70 MMHG | WEIGHT: 175.06 LBS | SYSTOLIC BLOOD PRESSURE: 140 MMHG | RESPIRATION RATE: 16 BRPM

## 2018-01-14 NOTE — MISCELLANEOUS
Message  see luan's note from yesterday  called neurosurgical [facility] today, they transf me to , left vm informing her that pt has ov w/ Dr Zachary Franks 4/12, he will discuss ref to Dr Zoila Magallon w/ pt at that time  Active Problems    1  Abnormal blood chemistry (790 6) (R79 9)   2  Actinic keratosis (702 0) (L57 0)   3  Aneurysm, cerebral (437 3) (I67 1)   4  Asymptomatic stenosis of left carotid artery (433 10) (I65 22)   5  Atherosclerosis of native coronary artery without angina pectoris (414 01) (I25 10)   6  Coronary arteriosclerosis (414 00) (I25 10)   7  Encounter for prostate cancer screening (V76 44) (Z12 5)   8  Hyperglycemia (790 29) (R73 9)   9  Hyperlipidemia (272 4) (E78 5)   10  Hypertension (401 9) (I10)   11  Malignant neoplasm of skin (173 90) (C44 90)   12  Old myocardial infarction (412) (I25 2)   13  Screening for skin condition (V82 0) (Z13 89)   14  Seborrheic keratosis (702 19) (L82 1)    Current Meds   1  Aspirin 81 MG Oral Tablet; Therapy: (Recorded:15Jan2014) to Recorded   2  Clopidogrel Bisulfate 75 MG Oral Tablet (Plavix); TAKE ONE TABLET BY MOUTH ONCE   DAILY; Therapy: 48WSY2127 to (Evaluate:11Oct2016)  Requested for: 29FHW3215; Last   Rx:15Jan2016 Ordered   3  Cozaar 50 MG Oral Tablet (Losartan Potassium); TAKE 1 TABLET DAILY; Therapy: (Recorded:13Jan2014) to Recorded   4  Losartan Potassium 50 MG Oral Tablet; TAKE ONE TABLET BY MOUTH ONCE DAILY; Therapy: 04GYO6096 to (Flakita Nez Perce)  Requested for: 36SUS8212; Last   Rx:06Bud0064 Ordered   5  Metoprolol Succinate ER 25 MG Oral Tablet Extended Release 24 Hour; take one tablet   by mouth every day; Therapy: 15Apr2014 to (Evaluate:26Vvm0480)  Requested for: 21Apr2015; Last   Rx:21Apr2015 Ordered   6  Nitroglycerin 0 4 MG SUBL; SL AS DIRECTED; Therapy: (Recorded:13Jan2014) to Recorded   7  Pravastatin Sodium 40 MG Oral Tablet; TAKE ONE TABLET BY MOUTH ONCE DAILY;    Therapy: 47ZZW6465 to (Evaluate:22Jan2017) Requested for: 31ITZ2526; Last   Rx:28Jan2016 Ordered   8  Tylenol 8 Hour 650 MG Oral Tablet Extended Release; 1 tab q 4-6 hrs prn; Therapy: (Recorded:13Jan2014) to Recorded    Allergies    1  No Known Drug Allergies    2  Eggs   3   Milk    Signatures   Electronically signed by : Lyssa Miguel, ; Mar 31 2016  9:04AM EST                       (Author)

## 2018-01-15 NOTE — MISCELLANEOUS
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)   4  Aneurysm, cerebral (437 3) (I67 1)   5  Asymptomatic stenosis of left carotid artery (433 10) (I65 22)   6  Atherosclerosis of native coronary artery without angina pectoris (414 01) (I25 10)   7  Coronary arteriosclerosis (414 00) (I25 10)   8  Encounter for prostate cancer screening (V76 44) (Z12 5)   9  Hyperglycemia (790 29) (R73 9)   10  Hyperlipidemia (272 4) (E78 5)   11  Hypertension (401 9) (I10)   12  Malignant neoplasm of skin (173 90) (C44 90)   13  Old myocardial infarction (412) (I25 2)   14  Screening for skin condition (V82 0) (Z13 89)   15  Seborrheic keratosis (702 19) (L82 1)    Current Meds   1  Aspirin 81 MG Oral Tablet; Therapy: (Recorded:15Jan2014) to Recorded   2  Clopidogrel Bisulfate 75 MG Oral Tablet (Plavix); TAKE ONE TABLET BY MOUTH ONCE   DAILY; Therapy: 36GJR0551 to (Evaluate:11Oct2016)  Requested for: 16IUV9360; Last   Rx:15Jan2016 Ordered   3  Cozaar 50 MG Oral Tablet (Losartan Potassium); TAKE 1 TABLET DAILY; Therapy: (Recorded:13Jan2014) to Recorded   4  Losartan Potassium 50 MG Oral Tablet; TAKE ONE TABLET BY MOUTH ONCE DAILY; Therapy: 26CXZ8594 to (Dede Arrington)  Requested for: 94NRS1715; Last   Rx:78Duv6143 Ordered   5  Metoprolol Succinate ER 25 MG Oral Tablet Extended Release 24 Hour; take one tablet   by mouth every day; Therapy: 15Apr2014 to (Evaluate:08Apr2017)  Requested for: 66Dso8993; Last   Rx:13Apr2016 Ordered   6  Nitroglycerin 0 4 MG SUBL; SL AS DIRECTED; Therapy: (Recorded:13Jan2014) to Recorded   7   Pravastatin Sodium 40 MG Oral Tablet; TAKE ONE TABLET BY MOUTH ONCE DAILY; Therapy: 55DIW0476 to (Evaluate:22Jan2017)  Requested for: 88WMH9022; Last   Rx:28Jan2016 Ordered   8  Tylenol 8 Hour 650 MG Oral Tablet Extended Release; 1 tab q 4-6 hrs prn; Therapy: (Recorded:13Jan2014) to Recorded    Allergies    1  No Known Drug Allergies    2  Eggs   3   Milk    Signatures   Electronically signed by : Zenobia Linn, ; Apr 14 2016  9:38AM EST                       (Author)